# Patient Record
Sex: FEMALE | Race: WHITE | NOT HISPANIC OR LATINO | URBAN - METROPOLITAN AREA
[De-identification: names, ages, dates, MRNs, and addresses within clinical notes are randomized per-mention and may not be internally consistent; named-entity substitution may affect disease eponyms.]

---

## 2017-11-29 ENCOUNTER — OFFICE (OUTPATIENT)
Dept: URBAN - METROPOLITAN AREA CLINIC 11 | Facility: CLINIC | Age: 62
End: 2017-11-29

## 2017-11-29 VITALS
HEART RATE: 70 BPM | SYSTOLIC BLOOD PRESSURE: 166 MMHG | DIASTOLIC BLOOD PRESSURE: 83 MMHG | HEIGHT: 65 IN | WEIGHT: 197 LBS

## 2017-11-29 DIAGNOSIS — K21.9 GASTRO-ESOPHAGEAL REFLUX DISEASE WITHOUT ESOPHAGITIS: ICD-10-CM

## 2017-11-29 DIAGNOSIS — Z86.010 PERSONAL HISTORY OF COLONIC POLYPS: ICD-10-CM

## 2017-11-29 DIAGNOSIS — K75.81 NONALCOHOLIC STEATOHEPATITIS (NASH): ICD-10-CM

## 2017-11-29 DIAGNOSIS — R94.5 ABNORMAL RESULTS OF LIVER FUNCTION STUDIES: ICD-10-CM

## 2017-11-29 DIAGNOSIS — E66.9 OBESITY, UNSPECIFIED: ICD-10-CM

## 2017-11-29 DIAGNOSIS — K74.3 PRIMARY BILIARY CIRRHOSIS: ICD-10-CM

## 2017-11-29 LAB
CBC, PLATELET, NO DIFFERENTIAL: HEMATOCRIT: 39.5 % (ref 34–46.6)
CBC, PLATELET, NO DIFFERENTIAL: HEMOGLOBIN: 13 G/DL (ref 11.1–15.9)
CBC, PLATELET, NO DIFFERENTIAL: MCH: 29.1 PG (ref 26.6–33)
CBC, PLATELET, NO DIFFERENTIAL: MCHC: 32.9 G/DL (ref 31.5–35.7)
CBC, PLATELET, NO DIFFERENTIAL: MCV: 89 FL (ref 79–97)
CBC, PLATELET, NO DIFFERENTIAL: PLATELETS: 306 X10E3/UL (ref 150–379)
CBC, PLATELET, NO DIFFERENTIAL: RBC: 4.46 X10E6/UL (ref 3.77–5.28)
CBC, PLATELET, NO DIFFERENTIAL: RDW: 14.2 % (ref 12.3–15.4)
CBC, PLATELET, NO DIFFERENTIAL: WBC: 8.5 X10E3/UL (ref 3.4–10.8)
COMP. METABOLIC PANEL (14): A/G RATIO: 1.6 (ref 1.2–2.2)
COMP. METABOLIC PANEL (14): ALBUMIN, SERUM: 4.6 G/DL (ref 3.6–4.8)
COMP. METABOLIC PANEL (14): ALKALINE PHOSPHATASE, S: 206 IU/L — HIGH (ref 39–117)
COMP. METABOLIC PANEL (14): ALT (SGPT): 32 IU/L (ref 0–32)
COMP. METABOLIC PANEL (14): AST (SGOT): 38 IU/L (ref 0–40)
COMP. METABOLIC PANEL (14): BILIRUBIN, TOTAL: 0.4 MG/DL (ref 0–1.2)
COMP. METABOLIC PANEL (14): BUN/CREATININE RATIO: 16 (ref 12–28)
COMP. METABOLIC PANEL (14): BUN: 15 MG/DL (ref 8–27)
COMP. METABOLIC PANEL (14): CALCIUM, SERUM: 9.7 MG/DL (ref 8.7–10.3)
COMP. METABOLIC PANEL (14): CARBON DIOXIDE, TOTAL: 27 MMOL/L (ref 18–29)
COMP. METABOLIC PANEL (14): CHLORIDE, SERUM: 96 MMOL/L (ref 96–106)
COMP. METABOLIC PANEL (14): CREATININE, SERUM: 0.96 MG/DL (ref 0.57–1)
COMP. METABOLIC PANEL (14): EGFR IF AFRICN AM: 73 ML/MIN/1.73 (ref 59–?)
COMP. METABOLIC PANEL (14): EGFR IF NONAFRICN AM: 64 ML/MIN/1.73 (ref 59–?)
COMP. METABOLIC PANEL (14): GLOBULIN, TOTAL: 2.8 G/DL (ref 1.5–4.5)
COMP. METABOLIC PANEL (14): GLUCOSE, SERUM: 104 MG/DL — HIGH (ref 65–99)
COMP. METABOLIC PANEL (14): POTASSIUM, SERUM: 4.9 MMOL/L (ref 3.5–5.2)
COMP. METABOLIC PANEL (14): PROTEIN, TOTAL, SERUM: 7.4 G/DL (ref 6–8.5)
COMP. METABOLIC PANEL (14): SODIUM, SERUM: 141 MMOL/L (ref 134–144)
PROTHROMBIN TIME (PT): INR: 0.9 (ref 0.8–1.2)
PROTHROMBIN TIME (PT): PROTHROMBIN TIME: 9.7 SEC (ref 9.1–12)
VITAMIN B12 AND FOLATE: FOLATE (FOLIC ACID), SERUM: >20 NG/ML
VITAMIN B12 AND FOLATE: VITAMIN B12: 371 PG/ML (ref 211–946)
VITAMIN D, 25-HYDROXY: 57 NG/ML (ref 30–100)

## 2017-11-29 PROCEDURE — 99213 OFFICE O/P EST LOW 20 MIN: CPT | Performed by: INTERNAL MEDICINE

## 2017-11-29 NOTE — SERVICEHPINOTES
Ms. Mendez is a 62-year-old female here for follow-up of primary biliary cholangitis. She was initially seen by me in May 2015 because of elevated liver enzymes. She had been followed by Dr. Madrid in the past and apparently had a liver biopsy in 2005 which she told was negative. She returned with persistent elevated liver enzymes including significant elevation of alkaline phosphatase with normal bilirubin. Abdominal ultrasound was performed that revealed fatty liver but otherwise did not reveal any biliary obstruction. CT-guided liver biopsy was performed in 2015 which revealed steatohepatitis grade 1 of 4 and primary biliary cholangitis with fibrosis stage 1 of 4. Because of this the patient was started on ursodiol 600 mg twice a day, which is consistent with 13-15 mg/kg per day. Due to no improvement of her alkaline phosphatase on Ursodiol we did switch her to Ocaliva which she started at the end of last year. Her alk-phos did come down slightly, but unfortunate the patient has not followed up for repeat blood work. Overall the patient states that she is doing well. She denies any fevers, chills, nausea, vomiting, abdominal pain, diarrhea, constipation, or rectal bleeding. She does have some occasional reflux for which she takes Prevacid over-the-counter on an as-needed basis. She does have some occasional skin itching but this usually occurs only in the winter and usually right after she gets out of the shower. She does not have any itching on a regular basis and is usually controlled with lotion. She did undergo colon polyp surveillance in November 2015 with removal of three small adenomas. BR

## 2017-11-29 NOTE — SERVICENOTES
Overall the patient is doing well.  She is tolerating Ocaliva.  Unfortunately, as the patient has not come back for follow-up labs it is hard to know as to whether not it is working so we will recheck labs today.  Also check some vitamin levels.  She should otherwise let us know if she has any problems with the medications or any signs of hepatic decompensation.

## 2018-03-12 ENCOUNTER — OFFICE (OUTPATIENT)
Dept: URBAN - METROPOLITAN AREA CLINIC 14 | Facility: CLINIC | Age: 63
End: 2018-03-12

## 2018-06-20 ENCOUNTER — OFFICE (OUTPATIENT)
Dept: URBAN - METROPOLITAN AREA CLINIC 14 | Facility: CLINIC | Age: 63
End: 2018-06-20

## 2018-11-30 ENCOUNTER — OFFICE (OUTPATIENT)
Dept: URBAN - METROPOLITAN AREA CLINIC 11 | Facility: CLINIC | Age: 63
End: 2018-11-30

## 2018-11-30 VITALS
DIASTOLIC BLOOD PRESSURE: 75 MMHG | HEART RATE: 65 BPM | SYSTOLIC BLOOD PRESSURE: 134 MMHG | HEIGHT: 65 IN | WEIGHT: 180 LBS

## 2018-11-30 DIAGNOSIS — K75.81 NONALCOHOLIC STEATOHEPATITIS (NASH): ICD-10-CM

## 2018-11-30 DIAGNOSIS — D12.6 BENIGN NEOPLASM OF COLON, UNSPECIFIED: ICD-10-CM

## 2018-11-30 DIAGNOSIS — K74.3 PRIMARY BILIARY CIRRHOSIS: ICD-10-CM

## 2018-11-30 DIAGNOSIS — Z86.010 PERSONAL HISTORY OF COLONIC POLYPS: ICD-10-CM

## 2018-11-30 DIAGNOSIS — R74.8 ABNORMAL LEVELS OF OTHER SERUM ENZYMES: ICD-10-CM

## 2018-11-30 DIAGNOSIS — K21.9 GASTRO-ESOPHAGEAL REFLUX DISEASE WITHOUT ESOPHAGITIS: ICD-10-CM

## 2018-11-30 LAB
CBC, PLATELET, NO DIFFERENTIAL: HEMATOCRIT: 37.4 % (ref 34–46.6)
CBC, PLATELET, NO DIFFERENTIAL: HEMOGLOBIN: 11.9 G/DL (ref 11.1–15.9)
CBC, PLATELET, NO DIFFERENTIAL: MCH: 28.7 PG (ref 26.6–33)
CBC, PLATELET, NO DIFFERENTIAL: MCHC: 31.8 G/DL (ref 31.5–35.7)
CBC, PLATELET, NO DIFFERENTIAL: MCV: 90 FL (ref 79–97)
CBC, PLATELET, NO DIFFERENTIAL: PLATELETS: 331 X10E3/UL (ref 150–379)
CBC, PLATELET, NO DIFFERENTIAL: RBC: 4.14 X10E6/UL (ref 3.77–5.28)
CBC, PLATELET, NO DIFFERENTIAL: RDW: 13.6 % (ref 12.3–15.4)
CBC, PLATELET, NO DIFFERENTIAL: WBC: 6.3 X10E3/UL (ref 3.4–10.8)
COMP. METABOLIC PANEL (14): A/G RATIO: 1.7 (ref 1.2–2.2)
COMP. METABOLIC PANEL (14): ALBUMIN: 4.3 G/DL (ref 3.6–4.8)
COMP. METABOLIC PANEL (14): ALKALINE PHOSPHATASE: 312 IU/L — HIGH (ref 39–117)
COMP. METABOLIC PANEL (14): ALT (SGPT): 32 IU/L (ref 0–32)
COMP. METABOLIC PANEL (14): AST (SGOT): 35 IU/L (ref 0–40)
COMP. METABOLIC PANEL (14): BILIRUBIN, TOTAL: 0.4 MG/DL (ref 0–1.2)
COMP. METABOLIC PANEL (14): BUN/CREATININE RATIO: 14 (ref 12–28)
COMP. METABOLIC PANEL (14): BUN: 13 MG/DL (ref 8–27)
COMP. METABOLIC PANEL (14): CALCIUM: 10.3 MG/DL (ref 8.7–10.3)
COMP. METABOLIC PANEL (14): CARBON DIOXIDE, TOTAL: 26 MMOL/L (ref 20–29)
COMP. METABOLIC PANEL (14): CHLORIDE: 103 MMOL/L (ref 96–106)
COMP. METABOLIC PANEL (14): CREATININE: 0.94 MG/DL (ref 0.57–1)
COMP. METABOLIC PANEL (14): EGFR IF AFRICN AM: 75 ML/MIN/1.73 (ref 59–?)
COMP. METABOLIC PANEL (14): EGFR IF NONAFRICN AM: 65 ML/MIN/1.73 (ref 59–?)
COMP. METABOLIC PANEL (14): GLOBULIN, TOTAL: 2.5 G/DL (ref 1.5–4.5)
COMP. METABOLIC PANEL (14): GLUCOSE: 102 MG/DL — HIGH (ref 65–99)
COMP. METABOLIC PANEL (14): POTASSIUM: 6.1 MMOL/L — HIGH (ref 3.5–5.2)
COMP. METABOLIC PANEL (14): PROTEIN, TOTAL: 6.8 G/DL (ref 6–8.5)
COMP. METABOLIC PANEL (14): SODIUM: 145 MMOL/L — HIGH (ref 134–144)
GGT: 569 IU/L — HIGH (ref 0–60)
IMMUNOGLOBULIN M, QN, SERUM: 45 MG/DL (ref 26–217)
PROTHROMBIN TIME (PT): INR: 0.9 (ref 0.8–1.2)
PROTHROMBIN TIME (PT): PROTHROMBIN TIME: 9.7 SEC (ref 9.1–12)

## 2018-11-30 PROCEDURE — 99213 OFFICE O/P EST LOW 20 MIN: CPT | Performed by: INTERNAL MEDICINE

## 2018-11-30 RX ORDER — URSODIOL 300 MG/1
CAPSULE ORAL
Qty: 120 | Refills: 11 | Status: COMPLETED
End: 2020-02-12

## 2018-11-30 RX ORDER — SODIUM PICOSULFATE, MAGNESIUM OXIDE, AND ANHYDROUS CITRIC ACID 10; 3.5; 12 MG/160ML; G/160ML; G/160ML
LIQUID ORAL
Qty: 1 | Refills: 0 | Status: COMPLETED
Start: 2018-11-30 | End: 2018-12-28

## 2018-11-30 NOTE — SERVICEHPINOTES
Ms. Mendez is a 63-year-old female here for follow-up of primary biliary cholangitis. She was initially seen by me in May 2015 because of elevated liver enzymes. She had been followed by Dr. Madrid in the past and apparently had a liver biopsy in 2005 which she told was negative. She returned with persistent elevated liver enzymes including significant elevation of alkaline phosphatase with normal bilirubin. Abdominal ultrasound was performed that revealed fatty liver but otherwise did not reveal any biliary obstruction. CT-guided liver biopsy was performed in 2015 which revealed steatohepatitis grade 1 of 4 and primary biliary cholangitis with fibrosis stage 1 of 4. Because of this the patient was started on ursodiol 600 mg twice a day, which is consistent with 13-15 mg/kg per day. Due to no improvement of her alkaline phosphatase on Ursodiol we did switch her to Ocaliva which she started at the end of 2016. Her alk-phos did come down slightly, but unfortunately the patient has not followed up for repeat blood work. She does have some occasional reflux for which she takes Prevacid over-the-counter on an as-needed basis. She did undergo colon polyp surveillance in November 2015 with removal of three small adenomas.Overall the patient is doing well. She denies any fevers, chills, nausea, vomiting, abdominal pain, diarrhea, constipation, or rectal bleeding. She does not have any jaundice or skin itching. Unfortunately, the patient did not really keep up with lab work. Her most recent blood work in June did reveal slight increase in LFTs which could be related to SHIRLEY but also the PBC. Her prescription for the Ocaliva ran out and so we tried to get this renewed. We were able to get a renewed, however the patient misunderstood and decided to go ahead and stop Ocalvia and is now back to only taking Ursodiol 600 mg twice a day. BR

## 2018-11-30 NOTE — SERVICENOTES
Overall she appears to be doing well.  She does not have any symptoms.  Her liver enzymes have mild persistent elevation.  Given that she did not have significant response to ursodiol or Ocaliva, we will try to get her in for a research study for PBC which just opened up.  If she is not a candidate for this then I recommend we go back on Ocaliva.  She has done well with weight loss and continues to watch her diet.  The patient is now due for surveillance colonoscopy so we will schedule her for this as well.

## 2018-12-05 ENCOUNTER — OFFICE (OUTPATIENT)
Dept: URBAN - METROPOLITAN AREA CLINIC 14 | Facility: CLINIC | Age: 63
End: 2018-12-05

## 2018-12-28 ENCOUNTER — OFFICE (OUTPATIENT)
Dept: URBAN - METROPOLITAN AREA PATHOLOGY 22 | Facility: PATHOLOGY | Age: 63
End: 2018-12-28

## 2018-12-28 ENCOUNTER — AMBULATORY SURGICAL CENTER (OUTPATIENT)
Dept: URBAN - METROPOLITAN AREA SURGERY 2 | Facility: SURGERY | Age: 63
End: 2018-12-28

## 2018-12-28 VITALS
HEART RATE: 78 BPM | OXYGEN SATURATION: 100 % | OXYGEN SATURATION: 97 % | WEIGHT: 175 LBS | OXYGEN SATURATION: 100 % | RESPIRATION RATE: 18 BRPM | SYSTOLIC BLOOD PRESSURE: 108 MMHG | HEART RATE: 64 BPM | RESPIRATION RATE: 20 BRPM | SYSTOLIC BLOOD PRESSURE: 133 MMHG | OXYGEN SATURATION: 97 % | TEMPERATURE: 97.7 F | SYSTOLIC BLOOD PRESSURE: 126 MMHG | DIASTOLIC BLOOD PRESSURE: 56 MMHG | TEMPERATURE: 97.7 F | DIASTOLIC BLOOD PRESSURE: 70 MMHG | SYSTOLIC BLOOD PRESSURE: 116 MMHG | RESPIRATION RATE: 18 BRPM | TEMPERATURE: 97.9 F | HEART RATE: 64 BPM | SYSTOLIC BLOOD PRESSURE: 108 MMHG | WEIGHT: 175 LBS | HEIGHT: 65 IN | HEART RATE: 72 BPM | SYSTOLIC BLOOD PRESSURE: 133 MMHG | DIASTOLIC BLOOD PRESSURE: 74 MMHG | DIASTOLIC BLOOD PRESSURE: 56 MMHG | HEART RATE: 72 BPM | DIASTOLIC BLOOD PRESSURE: 74 MMHG | SYSTOLIC BLOOD PRESSURE: 116 MMHG | TEMPERATURE: 97.9 F | RESPIRATION RATE: 16 BRPM | RESPIRATION RATE: 16 BRPM | SYSTOLIC BLOOD PRESSURE: 126 MMHG | HEIGHT: 65 IN | DIASTOLIC BLOOD PRESSURE: 70 MMHG | HEART RATE: 78 BPM | RESPIRATION RATE: 20 BRPM

## 2018-12-28 DIAGNOSIS — Z86.010 PERSONAL HISTORY OF COLONIC POLYPS: ICD-10-CM

## 2018-12-28 DIAGNOSIS — K64.0 FIRST DEGREE HEMORRHOIDS: ICD-10-CM

## 2018-12-28 DIAGNOSIS — D12.2 BENIGN NEOPLASM OF ASCENDING COLON: ICD-10-CM

## 2018-12-28 DIAGNOSIS — D12.0 BENIGN NEOPLASM OF CECUM: ICD-10-CM

## 2018-12-28 DIAGNOSIS — D12.6 BENIGN NEOPLASM OF COLON, UNSPECIFIED: ICD-10-CM

## 2018-12-28 PROCEDURE — 88305 TISSUE EXAM BY PATHOLOGIST: CPT | Performed by: INTERNAL MEDICINE

## 2018-12-28 PROCEDURE — 45380 COLONOSCOPY AND BIOPSY: CPT | Mod: 59 | Performed by: INTERNAL MEDICINE

## 2018-12-28 PROCEDURE — 45385 COLONOSCOPY W/LESION REMOVAL: CPT | Mod: 33 | Performed by: INTERNAL MEDICINE

## 2019-01-10 ENCOUNTER — OFFICE (OUTPATIENT)
Dept: URBAN - METROPOLITAN AREA CLINIC 19 | Facility: CLINIC | Age: 64
End: 2019-01-10

## 2019-01-10 DIAGNOSIS — K75.81 NONALCOHOLIC STEATOHEPATITIS (NASH): ICD-10-CM

## 2019-01-10 PROCEDURE — 76705 ECHO EXAM OF ABDOMEN: CPT | Performed by: INTERNAL MEDICINE

## 2019-05-28 ENCOUNTER — OFFICE (OUTPATIENT)
Dept: URBAN - METROPOLITAN AREA CLINIC 11 | Facility: CLINIC | Age: 64
End: 2019-05-28

## 2019-08-19 ENCOUNTER — OFFICE (OUTPATIENT)
Dept: URBAN - METROPOLITAN AREA CLINIC 14 | Facility: CLINIC | Age: 64
End: 2019-08-19

## 2019-12-19 ENCOUNTER — OFFICE (OUTPATIENT)
Dept: URBAN - METROPOLITAN AREA CLINIC 14 | Facility: CLINIC | Age: 64
End: 2019-12-19

## 2019-12-19 VITALS
SYSTOLIC BLOOD PRESSURE: 131 MMHG | HEART RATE: 76 BPM | DIASTOLIC BLOOD PRESSURE: 76 MMHG | WEIGHT: 175 LBS | HEIGHT: 65 IN

## 2019-12-19 DIAGNOSIS — K21.9 GASTRO-ESOPHAGEAL REFLUX DISEASE WITHOUT ESOPHAGITIS: ICD-10-CM

## 2019-12-19 DIAGNOSIS — K75.81 NONALCOHOLIC STEATOHEPATITIS (NASH): ICD-10-CM

## 2019-12-19 DIAGNOSIS — K74.3 PRIMARY BILIARY CIRRHOSIS: ICD-10-CM

## 2019-12-19 DIAGNOSIS — Z86.010 PERSONAL HISTORY OF COLONIC POLYPS: ICD-10-CM

## 2019-12-19 LAB
CBC, PLATELET, NO DIFFERENTIAL: HEMATOCRIT: 36.6 % (ref 34–46.6)
CBC, PLATELET, NO DIFFERENTIAL: HEMOGLOBIN: 11.9 G/DL (ref 11.1–15.9)
CBC, PLATELET, NO DIFFERENTIAL: MCH: 29.2 PG (ref 26.6–33)
CBC, PLATELET, NO DIFFERENTIAL: MCHC: 32.5 G/DL (ref 31.5–35.7)
CBC, PLATELET, NO DIFFERENTIAL: MCV: 90 FL (ref 79–97)
CBC, PLATELET, NO DIFFERENTIAL: NRBC: (no result)
CBC, PLATELET, NO DIFFERENTIAL: PLATELETS: 325 X10E3/UL (ref 150–450)
CBC, PLATELET, NO DIFFERENTIAL: RBC: 4.08 X10E6/UL (ref 3.77–5.28)
CBC, PLATELET, NO DIFFERENTIAL: RDW: 14.2 % (ref 12.3–15.4)
CBC, PLATELET, NO DIFFERENTIAL: WBC: 9 X10E3/UL (ref 3.4–10.8)
COMP. METABOLIC PANEL (14): A/G RATIO: 1.7 (ref 1.2–2.2)
COMP. METABOLIC PANEL (14): ALBUMIN: 4.6 G/DL (ref 3.6–4.8)
COMP. METABOLIC PANEL (14): ALKALINE PHOSPHATASE: 321 IU/L — HIGH (ref 39–117)
COMP. METABOLIC PANEL (14): ALT (SGPT): 40 IU/L — HIGH (ref 0–32)
COMP. METABOLIC PANEL (14): AST (SGOT): 52 IU/L — HIGH (ref 0–40)
COMP. METABOLIC PANEL (14): BILIRUBIN, TOTAL: 0.4 MG/DL (ref 0–1.2)
COMP. METABOLIC PANEL (14): BUN/CREATININE RATIO: 22 (ref 12–28)
COMP. METABOLIC PANEL (14): BUN: 17 MG/DL (ref 8–27)
COMP. METABOLIC PANEL (14): CALCIUM: 10.5 MG/DL — HIGH (ref 8.7–10.3)
COMP. METABOLIC PANEL (14): CARBON DIOXIDE, TOTAL: 24 MMOL/L (ref 20–29)
COMP. METABOLIC PANEL (14): CHLORIDE: 100 MMOL/L (ref 96–106)
COMP. METABOLIC PANEL (14): CREATININE: 0.79 MG/DL (ref 0.57–1)
COMP. METABOLIC PANEL (14): EGFR IF AFRICN AM: 91 ML/MIN/1.73 (ref 59–?)
COMP. METABOLIC PANEL (14): EGFR IF NONAFRICN AM: 79 ML/MIN/1.73 (ref 59–?)
COMP. METABOLIC PANEL (14): GLOBULIN, TOTAL: 2.7 G/DL (ref 1.5–4.5)
COMP. METABOLIC PANEL (14): GLUCOSE: 88 MG/DL (ref 65–99)
COMP. METABOLIC PANEL (14): POTASSIUM: 5.4 MMOL/L — HIGH (ref 3.5–5.2)
COMP. METABOLIC PANEL (14): PROTEIN, TOTAL: 7.3 G/DL (ref 6–8.5)
COMP. METABOLIC PANEL (14): SODIUM: 141 MMOL/L (ref 134–144)
GGT: 610 IU/L — HIGH (ref 0–60)
PROTHROMBIN TIME (PT): INR: 0.9 (ref 0.8–1.2)
PROTHROMBIN TIME (PT): PROTHROMBIN TIME: 9.8 SEC (ref 9.1–12)

## 2019-12-19 PROCEDURE — 99213 OFFICE O/P EST LOW 20 MIN: CPT | Performed by: INTERNAL MEDICINE

## 2019-12-19 NOTE — SERVICEHPINOTES
Ms. Mendez is a 64-year-old female here for follow-up of primary biliary cholangitis and SHIRLEY. She was initially seen by me in May 2015 because of elevated liver enzymes. She had been followed by Dr. Madrid in the past and apparently had a liver biopsy in 2005 which she told was negative. She returned with persistent elevated liver enzymes including significant elevation of alkaline phosphatase with normal bilirubin. Abdominal ultrasound was performed that revealed fatty liver but otherwise did not reveal any biliary obstruction. CT-guided liver biopsy was performed in 2015 which revealed steatohepatitis grade 1 of 4 and primary biliary cholangitis with fibrosis stage 1 of 4. Because of this the patient was started on ursodiol 600 mg twice a day, which is consistent with 13-15 mg/kg per day. Due to no improvement of her alkaline phosphatase on Ursodiol we did switch her to Ocaliva which she started at the end of 2016. Her alk-phos did come down slightly. She does have some occasional reflux for which she takes Prevacid over-the-counter on an as-needed basis. She did undergo colon polyp surveillance in November 2015 with removal of three small adenomas.Interim History:BRThe patient ultimately running out of the Ocaliva and never restarted. When we last saw her we decided to go ahead and have her evaluated for research study but unfortunately she failed screening because of the presence of SHIRLEY. She did undergo repeat colonoscopy in December 2018 with removal of a couple of adenomatous polyps. Liver ultrasound was unremarkable except for some fatty liver. Her most recent blood work in August did reveal persistent elevation of alk-phos at 387 and GGT at 665. Because of this and her failure of qualification for the research study we decided to put her back Ocaliva for which she started about one month ago. She has tolerated it well with no abdominal pain, nausea, vomiting, or diarrhea. She otherwise denies any fevers, chills, nausea, vomiting, or abdominal pain. TA

## 2019-12-19 NOTE — SERVICENOTES
Given that she has had some response to Ocaliva in the past we will go ahead and have her continue this.  We will check blood work today.  She has been taking Ursodiol along with this medication so we will have her stop the Ursodiol and recheck labs in three months.  In the meantime I will also recheck fibroscan to ensure that she has not had any progression of fibrosis.

## 2020-03-04 ENCOUNTER — OFFICE (OUTPATIENT)
Dept: URBAN - METROPOLITAN AREA CLINIC 14 | Facility: CLINIC | Age: 65
End: 2020-03-04

## 2020-03-31 ENCOUNTER — OFFICE (OUTPATIENT)
Dept: URBAN - METROPOLITAN AREA CLINIC 14 | Facility: CLINIC | Age: 65
End: 2020-03-31

## 2020-03-31 VITALS — HEIGHT: 65 IN

## 2020-03-31 DIAGNOSIS — K75.81 NONALCOHOLIC STEATOHEPATITIS (NASH): ICD-10-CM

## 2020-03-31 PROCEDURE — 91200 LIVER ELASTOGRAPHY: CPT | Performed by: INTERNAL MEDICINE

## 2020-06-25 ENCOUNTER — OFFICE (OUTPATIENT)
Dept: URBAN - METROPOLITAN AREA CLINIC 14 | Facility: CLINIC | Age: 65
End: 2020-06-25

## 2020-06-25 VITALS
HEIGHT: 65 IN | HEART RATE: 83 BPM | RESPIRATION RATE: 16 BRPM | DIASTOLIC BLOOD PRESSURE: 73 MMHG | WEIGHT: 162 LBS | SYSTOLIC BLOOD PRESSURE: 116 MMHG

## 2020-06-25 DIAGNOSIS — K74.3 PRIMARY BILIARY CIRRHOSIS: ICD-10-CM

## 2020-06-25 DIAGNOSIS — R74.8 ABNORMAL LEVELS OF OTHER SERUM ENZYMES: ICD-10-CM

## 2020-06-25 DIAGNOSIS — K75.81 NONALCOHOLIC STEATOHEPATITIS (NASH): ICD-10-CM

## 2020-06-25 LAB
CBC, PLATELET, NO DIFFERENTIAL: HEMATOCRIT: 33.9 % — LOW (ref 34–46.6)
CBC, PLATELET, NO DIFFERENTIAL: HEMOGLOBIN: 11.8 G/DL (ref 11.1–15.9)
CBC, PLATELET, NO DIFFERENTIAL: MCH: 30.8 PG (ref 26.6–33)
CBC, PLATELET, NO DIFFERENTIAL: MCHC: 34.8 G/DL (ref 31.5–35.7)
CBC, PLATELET, NO DIFFERENTIAL: MCV: 89 FL (ref 79–97)
CBC, PLATELET, NO DIFFERENTIAL: PLATELETS: 305 X10E3/UL (ref 150–450)
CBC, PLATELET, NO DIFFERENTIAL: RBC: 3.83 X10E6/UL (ref 3.77–5.28)
CBC, PLATELET, NO DIFFERENTIAL: RDW: 13.4 % (ref 11.7–15.4)
CBC, PLATELET, NO DIFFERENTIAL: WBC: 9 X10E3/UL (ref 3.4–10.8)
COMP. METABOLIC PANEL (14): A/G RATIO: 1.8 (ref 1.2–2.2)
COMP. METABOLIC PANEL (14): ALBUMIN: 4.6 G/DL (ref 3.8–4.8)
COMP. METABOLIC PANEL (14): ALKALINE PHOSPHATASE: 293 IU/L — HIGH (ref 39–117)
COMP. METABOLIC PANEL (14): ALT (SGPT): 27 IU/L (ref 0–32)
COMP. METABOLIC PANEL (14): AST (SGOT): 39 IU/L (ref 0–40)
COMP. METABOLIC PANEL (14): BILIRUBIN, TOTAL: 0.7 MG/DL (ref 0–1.2)
COMP. METABOLIC PANEL (14): BUN/CREATININE RATIO: 18 (ref 12–28)
COMP. METABOLIC PANEL (14): BUN: 17 MG/DL (ref 8–27)
COMP. METABOLIC PANEL (14): CALCIUM: 9.9 MG/DL (ref 8.7–10.3)
COMP. METABOLIC PANEL (14): CARBON DIOXIDE, TOTAL: 24 MMOL/L (ref 20–29)
COMP. METABOLIC PANEL (14): CHLORIDE: 96 MMOL/L (ref 96–106)
COMP. METABOLIC PANEL (14): CREATININE: 0.93 MG/DL (ref 0.57–1)
COMP. METABOLIC PANEL (14): EGFR IF AFRICN AM: 75 ML/MIN/1.73 (ref 59–?)
COMP. METABOLIC PANEL (14): EGFR IF NONAFRICN AM: 65 ML/MIN/1.73 (ref 59–?)
COMP. METABOLIC PANEL (14): GLOBULIN, TOTAL: 2.6 G/DL (ref 1.5–4.5)
COMP. METABOLIC PANEL (14): GLUCOSE: 81 MG/DL (ref 65–99)
COMP. METABOLIC PANEL (14): POTASSIUM: 4.6 MMOL/L (ref 3.5–5.2)
COMP. METABOLIC PANEL (14): PROTEIN, TOTAL: 7.2 G/DL (ref 6–8.5)
COMP. METABOLIC PANEL (14): SODIUM: 137 MMOL/L (ref 134–144)
GGT: 607 IU/L — HIGH (ref 0–60)

## 2020-06-25 PROCEDURE — 99214 OFFICE O/P EST MOD 30 MIN: CPT

## 2020-06-25 NOTE — SERVICEHPINOTES
Ms. Mendez is a 64-year-old female here for a follow-up for PCB/SHIRLEY. The patient recently underwent a fibroscan 3/31/2020 the revealed none to minimal fibrosis (FO-F1). The patient is currently on Ocaliva and Ursodial. The patient reports taking her medication as directed has no problem obtaining her medication. The patient reports that she has lost approximately 50 lb over the last year. The patient states that she is trying to lose weight to control her cholesterol and diabetes. The patient states that she exercises on a regular basis. The patient has lost 13 lb since her last visit intentionally. The patient denies any abdominal pain, nausea, vomiting, jaundice, changes in urine or stool color, or anorexia. The patient is due for colonoscopy in 2023.

## 2020-06-25 NOTE — SERVICENOTES
The patient was seen along with Lauryn Escoto NP.  I have evaluated the patient, discussed with NP, and agree with the above history, impression, and plan.Overall she does appear to be doing well.  Will check her basic labs as above.  Her abdominal exam is benign.  She has no tenderness.  Liver is not enlarged.  She should notify us if she has any worsening of symptoms, itching, jaundice, etc in the meantime.

## 2020-12-10 ENCOUNTER — OFFICE (OUTPATIENT)
Dept: URBAN - METROPOLITAN AREA CLINIC 14 | Facility: CLINIC | Age: 65
End: 2020-12-10

## 2020-12-10 VITALS
DIASTOLIC BLOOD PRESSURE: 77 MMHG | WEIGHT: 162 LBS | SYSTOLIC BLOOD PRESSURE: 146 MMHG | OXYGEN SATURATION: 98 % | RESPIRATION RATE: 16 BRPM | HEIGHT: 65 IN | HEART RATE: 75 BPM

## 2020-12-10 DIAGNOSIS — K59.00 CONSTIPATION, UNSPECIFIED: ICD-10-CM

## 2020-12-10 DIAGNOSIS — K74.3 PRIMARY BILIARY CIRRHOSIS: ICD-10-CM

## 2020-12-10 DIAGNOSIS — K75.81 NONALCOHOLIC STEATOHEPATITIS (NASH): ICD-10-CM

## 2020-12-10 PROCEDURE — 99214 OFFICE O/P EST MOD 30 MIN: CPT

## 2020-12-10 RX ORDER — OBETICHOLIC ACID 10 MG/1
10 TABLET, FILM COATED ORAL
Qty: 30 | Refills: 11 | Status: COMPLETED
Start: 2019-08-30 | End: 2021-06-10

## 2020-12-10 RX ORDER — URSODIOL 300 MG/1
1200 CAPSULE ORAL
Qty: 120 | Refills: 11 | Status: ACTIVE
Start: 2020-03-05

## 2020-12-10 NOTE — SERVICEHPINOTES
Ms. Mendez is a 65-year-old female here for a follow-up for PCB/SHIRLEY. The patient had labs performed 06/25/2020 which did reveal an elevated alkaline phos of 293 Fadia GG T at 6:07 a.m. or tore an improvement from her previous visit. The patient had a fibroscan performed 03/31/2020 which revealed 0 2 minimal fibrosis. The patient is currently on Ocaliva and Ursodiol and states that she has no problem obtaining or taking her medication. The patient reports that she has been maintaining her intentional weight loss, although she has not been exercising as much since Covid-19. The patient states that she has had some difficulty recently due to constipation. The patient reports that due to her intentional weight loss she was able to come off metformin. The patient is taking fiber as needed. The patient reports that she does have an adequate intake of water. The patient has not tried a MiraLax regimen but is taking probiotics. The patient denies any abdominal pain, jaundice, pruritus, melena, hematochezia or unintentional weight loss. The patient is due for colonoscopy recall in 2023.

## 2020-12-10 NOTE — SERVICENOTES
The patient was seen along with Lauryn Escoto NP.  I have evaluated the patient, discussed with NP, and agree with the above documented findings, impression, and plan of care.-BRENNON

## 2020-12-11 LAB
CBC, PLATELET, NO DIFFERENTIAL: HEMATOCRIT: 34.1 % (ref 34–46.6)
CBC, PLATELET, NO DIFFERENTIAL: HEMOGLOBIN: 11.2 G/DL (ref 11.1–15.9)
CBC, PLATELET, NO DIFFERENTIAL: MCH: 29.8 PG (ref 26.6–33)
CBC, PLATELET, NO DIFFERENTIAL: MCHC: 32.8 G/DL (ref 31.5–35.7)
CBC, PLATELET, NO DIFFERENTIAL: MCV: 91 FL (ref 79–97)
CBC, PLATELET, NO DIFFERENTIAL: NRBC: (no result)
CBC, PLATELET, NO DIFFERENTIAL: PLATELETS: 329 X10E3/UL (ref 150–450)
CBC, PLATELET, NO DIFFERENTIAL: RBC: 3.76 X10E6/UL — LOW (ref 3.77–5.28)
CBC, PLATELET, NO DIFFERENTIAL: RDW: 12.5 % (ref 11.7–15.4)
CBC, PLATELET, NO DIFFERENTIAL: WBC: 8.1 X10E3/UL (ref 3.4–10.8)
COMP. METABOLIC PANEL (14): A/G RATIO: 1.7 (ref 1.2–2.2)
COMP. METABOLIC PANEL (14): ALBUMIN: 4.4 G/DL (ref 3.8–4.8)
COMP. METABOLIC PANEL (14): ALKALINE PHOSPHATASE: 260 IU/L — HIGH (ref 39–117)
COMP. METABOLIC PANEL (14): ALT (SGPT): 18 IU/L (ref 0–32)
COMP. METABOLIC PANEL (14): AST (SGOT): 32 IU/L (ref 0–40)
COMP. METABOLIC PANEL (14): BILIRUBIN, TOTAL: 0.4 MG/DL (ref 0–1.2)
COMP. METABOLIC PANEL (14): BUN/CREATININE RATIO: 20 (ref 12–28)
COMP. METABOLIC PANEL (14): BUN: 16 MG/DL (ref 8–27)
COMP. METABOLIC PANEL (14): CALCIUM: 9.8 MG/DL (ref 8.7–10.3)
COMP. METABOLIC PANEL (14): CARBON DIOXIDE, TOTAL: 24 MMOL/L (ref 20–29)
COMP. METABOLIC PANEL (14): CHLORIDE: 99 MMOL/L (ref 96–106)
COMP. METABOLIC PANEL (14): CREATININE: 0.81 MG/DL (ref 0.57–1)
COMP. METABOLIC PANEL (14): EGFR IF AFRICN AM: 88 ML/MIN/1.73 (ref 59–?)
COMP. METABOLIC PANEL (14): EGFR IF NONAFRICN AM: 76 ML/MIN/1.73 (ref 59–?)
COMP. METABOLIC PANEL (14): GLOBULIN, TOTAL: 2.6 G/DL (ref 1.5–4.5)
COMP. METABOLIC PANEL (14): GLUCOSE: 99 MG/DL (ref 65–99)
COMP. METABOLIC PANEL (14): POTASSIUM: 4.7 MMOL/L (ref 3.5–5.2)
COMP. METABOLIC PANEL (14): PROTEIN, TOTAL: 7 G/DL (ref 6–8.5)
COMP. METABOLIC PANEL (14): SODIUM: 138 MMOL/L (ref 134–144)
GGT: 311 IU/L — HIGH (ref 0–60)

## 2021-06-10 ENCOUNTER — OFFICE (OUTPATIENT)
Dept: URBAN - METROPOLITAN AREA CLINIC 14 | Facility: CLINIC | Age: 66
End: 2021-06-10

## 2021-06-10 VITALS
SYSTOLIC BLOOD PRESSURE: 134 MMHG | OXYGEN SATURATION: 100 % | HEIGHT: 65 IN | HEART RATE: 57 BPM | WEIGHT: 178 LBS | DIASTOLIC BLOOD PRESSURE: 71 MMHG

## 2021-06-10 DIAGNOSIS — K74.3 PRIMARY BILIARY CIRRHOSIS: ICD-10-CM

## 2021-06-10 DIAGNOSIS — K75.81 NONALCOHOLIC STEATOHEPATITIS (NASH): ICD-10-CM

## 2021-06-10 DIAGNOSIS — Z86.010 PERSONAL HISTORY OF COLONIC POLYPS: ICD-10-CM

## 2021-06-10 LAB
CBC, PLATELET, NO DIFFERENTIAL: HEMATOCRIT: 36.9 % (ref 34–46.6)
CBC, PLATELET, NO DIFFERENTIAL: HEMOGLOBIN: 12 G/DL (ref 11.1–15.9)
CBC, PLATELET, NO DIFFERENTIAL: MCH: 29.3 PG (ref 26.6–33)
CBC, PLATELET, NO DIFFERENTIAL: MCHC: 32.5 G/DL (ref 31.5–35.7)
CBC, PLATELET, NO DIFFERENTIAL: MCV: 90 FL (ref 79–97)
CBC, PLATELET, NO DIFFERENTIAL: NRBC: (no result)
CBC, PLATELET, NO DIFFERENTIAL: PLATELETS: 274 X10E3/UL (ref 150–450)
CBC, PLATELET, NO DIFFERENTIAL: RBC: 4.1 X10E6/UL (ref 3.77–5.28)
CBC, PLATELET, NO DIFFERENTIAL: RDW: 12.8 % (ref 11.7–15.4)
CBC, PLATELET, NO DIFFERENTIAL: WBC: 6.5 X10E3/UL (ref 3.4–10.8)
COMP. METABOLIC PANEL (14): A/G RATIO: 1.7 (ref 1.2–2.2)
COMP. METABOLIC PANEL (14): ALBUMIN: 4.5 G/DL (ref 3.8–4.8)
COMP. METABOLIC PANEL (14): ALKALINE PHOSPHATASE: 363 IU/L — HIGH (ref 48–121)
COMP. METABOLIC PANEL (14): ALT (SGPT): 29 IU/L (ref 0–32)
COMP. METABOLIC PANEL (14): AST (SGOT): 35 IU/L (ref 0–40)
COMP. METABOLIC PANEL (14): BILIRUBIN, TOTAL: 0.3 MG/DL (ref 0–1.2)
COMP. METABOLIC PANEL (14): BUN/CREATININE RATIO: 22 (ref 12–28)
COMP. METABOLIC PANEL (14): BUN: 17 MG/DL (ref 8–27)
COMP. METABOLIC PANEL (14): CALCIUM: 10 MG/DL (ref 8.7–10.3)
COMP. METABOLIC PANEL (14): CARBON DIOXIDE, TOTAL: 25 MMOL/L (ref 20–29)
COMP. METABOLIC PANEL (14): CHLORIDE: 100 MMOL/L (ref 96–106)
COMP. METABOLIC PANEL (14): CREATININE: 0.79 MG/DL (ref 0.57–1)
COMP. METABOLIC PANEL (14): EGFR IF AFRICN AM: 91 ML/MIN/1.73 (ref 59–?)
COMP. METABOLIC PANEL (14): EGFR IF NONAFRICN AM: 79 ML/MIN/1.73 (ref 59–?)
COMP. METABOLIC PANEL (14): GLOBULIN, TOTAL: 2.6 G/DL (ref 1.5–4.5)
COMP. METABOLIC PANEL (14): GLUCOSE: 91 MG/DL (ref 65–99)
COMP. METABOLIC PANEL (14): POTASSIUM: 4.9 MMOL/L (ref 3.5–5.2)
COMP. METABOLIC PANEL (14): PROTEIN, TOTAL: 7.1 G/DL (ref 6–8.5)
COMP. METABOLIC PANEL (14): SODIUM: 140 MMOL/L (ref 134–144)
PROTHROMBIN TIME (PT): INR: 1 (ref 0.9–1.2)
PROTHROMBIN TIME (PT): PROTHROMBIN TIME: 10.2 SEC (ref 9.1–12)

## 2021-06-10 PROCEDURE — 99213 OFFICE O/P EST LOW 20 MIN: CPT | Performed by: INTERNAL MEDICINE

## 2021-06-10 RX ORDER — URSODIOL 300 MG/1
1200 CAPSULE ORAL
Qty: 120 | Refills: 11 | Status: ACTIVE
Start: 2020-03-05

## 2022-07-07 ENCOUNTER — OFFICE (OUTPATIENT)
Dept: URBAN - METROPOLITAN AREA CLINIC 11 | Facility: CLINIC | Age: 67
End: 2022-07-07

## 2022-07-07 VITALS
HEIGHT: 65 IN | OXYGEN SATURATION: 100 % | DIASTOLIC BLOOD PRESSURE: 60 MMHG | SYSTOLIC BLOOD PRESSURE: 127 MMHG | WEIGHT: 180 LBS | HEART RATE: 68 BPM

## 2022-07-07 DIAGNOSIS — K59.00 CONSTIPATION, UNSPECIFIED: ICD-10-CM

## 2022-07-07 DIAGNOSIS — K74.3 PRIMARY BILIARY CIRRHOSIS: ICD-10-CM

## 2022-07-07 DIAGNOSIS — K75.81 NONALCOHOLIC STEATOHEPATITIS (NASH): ICD-10-CM

## 2022-07-07 DIAGNOSIS — Z86.010 PERSONAL HISTORY OF COLONIC POLYPS: ICD-10-CM

## 2022-07-07 DIAGNOSIS — K21.9 GASTRO-ESOPHAGEAL REFLUX DISEASE WITHOUT ESOPHAGITIS: ICD-10-CM

## 2022-07-07 LAB
CBC, PLATELET, NO DIFFERENTIAL: HEMATOCRIT: 38 % (ref 34–46.6)
CBC, PLATELET, NO DIFFERENTIAL: HEMOGLOBIN: 12.6 G/DL (ref 11.1–15.9)
CBC, PLATELET, NO DIFFERENTIAL: MCH: 29.2 PG (ref 26.6–33)
CBC, PLATELET, NO DIFFERENTIAL: MCHC: 33.2 G/DL (ref 31.5–35.7)
CBC, PLATELET, NO DIFFERENTIAL: MCV: 88 FL (ref 79–97)
CBC, PLATELET, NO DIFFERENTIAL: PLATELETS: 309 X10E3/UL (ref 150–450)
CBC, PLATELET, NO DIFFERENTIAL: RBC: 4.32 X10E6/UL (ref 3.77–5.28)
CBC, PLATELET, NO DIFFERENTIAL: RDW: 12.7 % (ref 11.7–15.4)
CBC, PLATELET, NO DIFFERENTIAL: WBC: 8.7 X10E3/UL (ref 3.4–10.8)
COMP. METABOLIC PANEL (14): A/G RATIO: 1.5 (ref 1.2–2.2)
COMP. METABOLIC PANEL (14): ALBUMIN: 4.5 G/DL (ref 3.8–4.8)
COMP. METABOLIC PANEL (14): ALKALINE PHOSPHATASE: 343 IU/L — HIGH (ref 44–121)
COMP. METABOLIC PANEL (14): ALT (SGPT): 28 IU/L (ref 0–32)
COMP. METABOLIC PANEL (14): AST (SGOT): 37 IU/L (ref 0–40)
COMP. METABOLIC PANEL (14): BILIRUBIN, TOTAL: 0.4 MG/DL (ref 0–1.2)
COMP. METABOLIC PANEL (14): BUN/CREATININE RATIO: 24 (ref 12–28)
COMP. METABOLIC PANEL (14): BUN: 21 MG/DL (ref 8–27)
COMP. METABOLIC PANEL (14): CALCIUM: 10.2 MG/DL (ref 8.7–10.3)
COMP. METABOLIC PANEL (14): CARBON DIOXIDE, TOTAL: 26 MMOL/L (ref 20–29)
COMP. METABOLIC PANEL (14): CHLORIDE: 96 MMOL/L (ref 96–106)
COMP. METABOLIC PANEL (14): CREATININE: 0.88 MG/DL (ref 0.57–1)
COMP. METABOLIC PANEL (14): EGFR: 72 ML/MIN/1.73 (ref 59–?)
COMP. METABOLIC PANEL (14): GLOBULIN, TOTAL: 3 G/DL (ref 1.5–4.5)
COMP. METABOLIC PANEL (14): GLUCOSE: 121 MG/DL — HIGH (ref 65–99)
COMP. METABOLIC PANEL (14): POTASSIUM: 4.7 MMOL/L (ref 3.5–5.2)
COMP. METABOLIC PANEL (14): PROTEIN, TOTAL: 7.5 G/DL (ref 6–8.5)
COMP. METABOLIC PANEL (14): SODIUM: 133 MMOL/L — LOW (ref 134–144)
PROTHROMBIN TIME (PT): INR: 0.9 (ref 0.9–1.2)
PROTHROMBIN TIME (PT): PROTHROMBIN TIME: 9.7 SEC (ref 9.1–12)

## 2022-07-07 PROCEDURE — 99214 OFFICE O/P EST MOD 30 MIN: CPT | Performed by: INTERNAL MEDICINE

## 2022-07-07 RX ORDER — URSODIOL 300 MG/1
1200 CAPSULE ORAL
Qty: 120 | Refills: 11 | Status: ACTIVE
Start: 2020-03-05

## 2023-01-19 ENCOUNTER — OFFICE (OUTPATIENT)
Dept: URBAN - METROPOLITAN AREA CLINIC 14 | Facility: CLINIC | Age: 68
End: 2023-01-19

## 2023-01-19 VITALS
HEIGHT: 65 IN | SYSTOLIC BLOOD PRESSURE: 156 MMHG | WEIGHT: 187 LBS | DIASTOLIC BLOOD PRESSURE: 70 MMHG | HEART RATE: 55 BPM | OXYGEN SATURATION: 98 %

## 2023-01-19 DIAGNOSIS — K59.00 CONSTIPATION, UNSPECIFIED: ICD-10-CM

## 2023-01-19 DIAGNOSIS — K75.81 NONALCOHOLIC STEATOHEPATITIS (NASH): ICD-10-CM

## 2023-01-19 DIAGNOSIS — K74.3 PRIMARY BILIARY CIRRHOSIS: ICD-10-CM

## 2023-01-19 DIAGNOSIS — K21.9 GASTRO-ESOPHAGEAL REFLUX DISEASE WITHOUT ESOPHAGITIS: ICD-10-CM

## 2023-01-19 DIAGNOSIS — Z86.010 PERSONAL HISTORY OF COLONIC POLYPS: ICD-10-CM

## 2023-01-19 PROCEDURE — 99213 OFFICE O/P EST LOW 20 MIN: CPT | Performed by: INTERNAL MEDICINE

## 2023-01-19 RX ORDER — CHOLESTYRAMINE 4 G/9G
8 POWDER, FOR SUSPENSION ORAL
Qty: 60 | Refills: 11 | Status: ACTIVE
Start: 2023-01-19

## 2023-01-20 LAB
CBC, PLATELET, NO DIFFERENTIAL: HEMATOCRIT: 38.6 % (ref 34–46.6)
CBC, PLATELET, NO DIFFERENTIAL: HEMOGLOBIN: 12.7 G/DL (ref 11.1–15.9)
CBC, PLATELET, NO DIFFERENTIAL: MCH: 28.9 PG (ref 26.6–33)
CBC, PLATELET, NO DIFFERENTIAL: MCHC: 32.9 G/DL (ref 31.5–35.7)
CBC, PLATELET, NO DIFFERENTIAL: MCV: 88 FL (ref 79–97)
CBC, PLATELET, NO DIFFERENTIAL: PLATELETS: 273 X10E3/UL (ref 150–450)
CBC, PLATELET, NO DIFFERENTIAL: RBC: 4.39 X10E6/UL (ref 3.77–5.28)
CBC, PLATELET, NO DIFFERENTIAL: RDW: 13 % (ref 11.7–15.4)
CBC, PLATELET, NO DIFFERENTIAL: WBC: 7.4 X10E3/UL (ref 3.4–10.8)
COMP. METABOLIC PANEL (14): A/G RATIO: 1.6 (ref 1.2–2.2)
COMP. METABOLIC PANEL (14): ALBUMIN: 4.4 G/DL (ref 3.8–4.8)
COMP. METABOLIC PANEL (14): ALKALINE PHOSPHATASE: 184 IU/L — HIGH (ref 44–121)
COMP. METABOLIC PANEL (14): ALT (SGPT): 22 IU/L (ref 0–32)
COMP. METABOLIC PANEL (14): AST (SGOT): 32 IU/L (ref 0–40)
COMP. METABOLIC PANEL (14): BILIRUBIN, TOTAL: 0.3 MG/DL (ref 0–1.2)
COMP. METABOLIC PANEL (14): BUN/CREATININE RATIO: 20 (ref 12–28)
COMP. METABOLIC PANEL (14): BUN: 16 MG/DL (ref 8–27)
COMP. METABOLIC PANEL (14): CALCIUM: 10.1 MG/DL (ref 8.7–10.3)
COMP. METABOLIC PANEL (14): CARBON DIOXIDE, TOTAL: 21 MMOL/L (ref 20–29)
COMP. METABOLIC PANEL (14): CHLORIDE: 98 MMOL/L (ref 96–106)
COMP. METABOLIC PANEL (14): CREATININE: 0.79 MG/DL (ref 0.57–1)
COMP. METABOLIC PANEL (14): EGFR: 82 ML/MIN/1.73 (ref 59–?)
COMP. METABOLIC PANEL (14): GLOBULIN, TOTAL: 2.8 G/DL (ref 1.5–4.5)
COMP. METABOLIC PANEL (14): GLUCOSE: 120 MG/DL — HIGH (ref 70–99)
COMP. METABOLIC PANEL (14): POTASSIUM: 4.7 MMOL/L (ref 3.5–5.2)
COMP. METABOLIC PANEL (14): PROTEIN, TOTAL: 7.2 G/DL (ref 6–8.5)
COMP. METABOLIC PANEL (14): SODIUM: 139 MMOL/L (ref 134–144)
LIPID PANEL: CHOLESTEROL, TOTAL: 217 MG/DL — HIGH (ref 100–199)
LIPID PANEL: COMMENT: (no result)
LIPID PANEL: HDL CHOLESTEROL: 85 MG/DL (ref 39–?)
LIPID PANEL: LDL CHOL CALC (NIH): 114 MG/DL — HIGH (ref 0–99)
LIPID PANEL: TRIGLYCERIDES: 103 MG/DL (ref 0–149)
LIPID PANEL: VLDL CHOLESTEROL CAL: 18 MG/DL (ref 5–40)
PROTHROMBIN TIME (PT): INR: 1 (ref 0.9–1.2)
PROTHROMBIN TIME (PT): PROTHROMBIN TIME: 10.2 SEC (ref 9.1–12)

## 2023-02-06 ENCOUNTER — OFFICE (OUTPATIENT)
Dept: URBAN - METROPOLITAN AREA CLINIC 14 | Facility: CLINIC | Age: 68
End: 2023-02-06

## 2023-02-06 VITALS — HEIGHT: 65 IN

## 2023-02-06 DIAGNOSIS — K75.81 NONALCOHOLIC STEATOHEPATITIS (NASH): ICD-10-CM

## 2023-02-06 DIAGNOSIS — R74.8 ABNORMAL LEVELS OF OTHER SERUM ENZYMES: ICD-10-CM

## 2023-02-06 PROCEDURE — 91200 LIVER ELASTOGRAPHY: CPT | Performed by: INTERNAL MEDICINE

## 2023-10-26 ENCOUNTER — OFFICE (OUTPATIENT)
Dept: URBAN - METROPOLITAN AREA CLINIC 14 | Facility: CLINIC | Age: 68
End: 2023-10-26

## 2023-10-26 VITALS
SYSTOLIC BLOOD PRESSURE: 133 MMHG | DIASTOLIC BLOOD PRESSURE: 70 MMHG | WEIGHT: 189 LBS | HEIGHT: 65 IN | HEART RATE: 83 BPM | OXYGEN SATURATION: 96 % | RESPIRATION RATE: 18 BRPM

## 2023-10-26 DIAGNOSIS — K74.3 PRIMARY BILIARY CIRRHOSIS: ICD-10-CM

## 2023-10-26 DIAGNOSIS — K75.81 NONALCOHOLIC STEATOHEPATITIS (NASH): ICD-10-CM

## 2023-10-26 DIAGNOSIS — K74.00 HEPATIC FIBROSIS, UNSPECIFIED: ICD-10-CM

## 2023-10-26 DIAGNOSIS — Z86.010 PERSONAL HISTORY OF COLONIC POLYPS: ICD-10-CM

## 2023-10-26 PROCEDURE — 99214 OFFICE O/P EST MOD 30 MIN: CPT | Performed by: INTERNAL MEDICINE

## 2023-10-26 RX ORDER — SODIUM PICOSULFATE, MAGNESIUM OXIDE, AND ANHYDROUS CITRIC ACID 10; 3.5; 12 MG/160ML; G/160ML; G/160ML
LIQUID ORAL
Qty: 350 | Refills: 0 | Status: COMPLETED
Start: 2023-10-26 | End: 2024-08-13

## 2023-10-26 NOTE — SERVICENOTES
MD Addendum: The patient was seen along with RENEE Car.  I have evaluated the patient, discussed with her, and agree with the above documented findings, impression, and plan of care.-BRENNON

## 2023-10-26 NOTE — SERVICEHPINOTES
Flori Paul   is a   68   year old  female   here today for follow up. Patient had a Fibroscan done on 2/2023 which revealed score of 9.8. Overall she has been doing well. She continues to take Ocaliva 10mg daily and Ursodiol 300mg 2 capsules twice a day. She reports occasional itching that happens every 2-3 months and usually lasts for 2-3 days. She take Cholestyramine as needed for itching which is helping. She states that she had a fall ~ a month ago and hit her head. She was evaluated at [facility] and was told that she has atherosclerosis. She is scheduled to see a cardiologist next week. She denies any known heart disease or taking blood thinner. She gained 2 lbs since last time she was seen. She denies any fever, chills, nausea, vomiting, abdominal pain, jaundice or bleeding. She is due for colonoscopy in December. Fibroscan due 2/2024. Previous GI History:Leonardo was initially seen by me in May 2015 because of elevated liver enzymes. She had been followed by Dr. Madrid in the past and apparently had a liver biopsy in 2005 which she told was negative. She returned with persistent elevated liver enzymes including significant elevation of alkaline phosphatase with normal bilirubin. Abdominal ultrasound was performed that revealed fatty liver but otherwise did not reveal any biliary obstruction. CT-guided liver biopsy was performed in 2015 which revealed steatohepatitis grade 1 of 4 and primary biliary cholangitis with fibrosis stage 1 of 4. Because of this the patient was started on ursodiol 600 mg twice a day, which is consistent with 13-15 mg/kg per day. Due to no improvement of her alkaline phosphatase on Ursodiol we did switch her to Ocaliva which she started at the end of 2016. Her alk-phos did come down slightly. She does have some occasional reflux for which she takes Prevacid over-the-counter on an as-needed basis. She did undergo colon polyp surveillance in November 2015 with removal of three small adenomas. She did undergo repeat colonoscopy in December 2018 with removal of a couple of adenomatous polyps. Liver ultrasound was unremarkable except for some fatty liver. Her most recent blood work in August did reveal persistent elevation of alk-phos at 387 and GGT at 665. Because of this and her failure of qualification for the research study we decided to put her back Ocaliva for which she started around November 2019 and was titrated up to 10mg/day. She did undergo fibro scan in March 2020 which was consistent with minimal to no fibrosis. Unfortunately, her insurance stopped covering Ocaliva since around February or March 2021 and so since then she has only been on ursodiol monotherapy for her primary biliary cholangitis. span id="{[id number]}" class="narrative freetextSelected" type="freetext" canedit="true" suppressed="false" nid="[id number]" gid="{[id number]}" bound="false" visited="true" /spanspan id="{[id number]}" class="narrative placeholderNormal" type="placeholder" canedit="true" suppressed="true" text="[ROS Wording]" nid="[id number]" gid="{[id number]}" propertyname="rosWording" displayname="ROS Wording" tooltip="" handler="" handlerdata="" datatype="text" mandatory="false" requires="" allowmultipleentries="" describes="" tagname="" prototype="" dontshowempty="false" empty="true" onmouseover="__NarrativeOnMouseOver('{[id number]}')" onmouseout="__NarrativeOnMouseOut('{[id number]}')" onmousedown="__NarrativePlaceholderClicked('{[id number]}')"/spanspan id="{[id number]}" class="narrative freetextNormal" type="freetext" canedit="true" suppressed="false" nid="[id number]" gid="{[id number]}" bound="false" /span

## 2023-10-27 LAB
CBC, PLATELET, NO DIFFERENTIAL: HEMATOCRIT: 42.1 % (ref 34–46.6)
CBC, PLATELET, NO DIFFERENTIAL: HEMOGLOBIN: 13.9 G/DL (ref 11.1–15.9)
CBC, PLATELET, NO DIFFERENTIAL: MCH: 29.4 PG (ref 26.6–33)
CBC, PLATELET, NO DIFFERENTIAL: MCHC: 33 G/DL (ref 31.5–35.7)
CBC, PLATELET, NO DIFFERENTIAL: MCV: 89 FL (ref 79–97)
CBC, PLATELET, NO DIFFERENTIAL: PLATELETS: 267 X10E3/UL (ref 150–450)
CBC, PLATELET, NO DIFFERENTIAL: RBC: 4.73 X10E6/UL (ref 3.77–5.28)
CBC, PLATELET, NO DIFFERENTIAL: RDW: 12.8 % (ref 11.7–15.4)
CBC, PLATELET, NO DIFFERENTIAL: WBC: 7.3 X10E3/UL (ref 3.4–10.8)
COMP. METABOLIC PANEL (14): A/G RATIO: 2.1 (ref 1.2–2.2)
COMP. METABOLIC PANEL (14): ALBUMIN: 4.6 G/DL (ref 3.9–4.9)
COMP. METABOLIC PANEL (14): ALKALINE PHOSPHATASE: 181 IU/L — HIGH (ref 44–121)
COMP. METABOLIC PANEL (14): ALT (SGPT): 28 IU/L (ref 0–32)
COMP. METABOLIC PANEL (14): AST (SGOT): 38 IU/L (ref 0–40)
COMP. METABOLIC PANEL (14): BILIRUBIN, TOTAL: 0.3 MG/DL (ref 0–1.2)
COMP. METABOLIC PANEL (14): BUN/CREATININE RATIO: 23 (ref 12–28)
COMP. METABOLIC PANEL (14): BUN: 23 MG/DL (ref 8–27)
COMP. METABOLIC PANEL (14): CALCIUM: 10 MG/DL (ref 8.7–10.3)
COMP. METABOLIC PANEL (14): CARBON DIOXIDE, TOTAL: 24 MMOL/L (ref 20–29)
COMP. METABOLIC PANEL (14): CHLORIDE: 104 MMOL/L (ref 96–106)
COMP. METABOLIC PANEL (14): CREATININE: 0.99 MG/DL (ref 0.57–1)
COMP. METABOLIC PANEL (14): EGFR: 62 ML/MIN/1.73 (ref 59–?)
COMP. METABOLIC PANEL (14): GLOBULIN, TOTAL: 2.2 G/DL (ref 1.5–4.5)
COMP. METABOLIC PANEL (14): GLUCOSE: 119 MG/DL — HIGH (ref 70–99)
COMP. METABOLIC PANEL (14): POTASSIUM: 4.4 MMOL/L (ref 3.5–5.2)
COMP. METABOLIC PANEL (14): PROTEIN, TOTAL: 6.8 G/DL (ref 6–8.5)
COMP. METABOLIC PANEL (14): SODIUM: 141 MMOL/L (ref 134–144)
PROTHROMBIN TIME (PT): INR: 1 (ref 0.9–1.2)
PROTHROMBIN TIME (PT): PROTHROMBIN TIME: 10.5 SEC (ref 9.1–12)

## 2024-08-13 ENCOUNTER — OFFICE (OUTPATIENT)
Dept: URBAN - METROPOLITAN AREA PATHOLOGY 12 | Facility: PATHOLOGY | Age: 69
End: 2024-08-13
Payer: MEDICARE

## 2024-08-13 DIAGNOSIS — D12.5 BENIGN NEOPLASM OF SIGMOID COLON: ICD-10-CM

## 2024-08-13 DIAGNOSIS — Z09 ENCOUNTER FOR FOLLOW-UP EXAMINATION AFTER COMPLETED TREATMEN: ICD-10-CM

## 2024-08-13 DIAGNOSIS — D12.2 BENIGN NEOPLASM OF ASCENDING COLON: ICD-10-CM

## 2024-08-13 DIAGNOSIS — D12.3 BENIGN NEOPLASM OF TRANSVERSE COLON: ICD-10-CM

## 2024-08-13 DIAGNOSIS — Z86.010 PERSONAL HISTORY OF COLONIC POLYPS: ICD-10-CM

## 2024-08-13 DIAGNOSIS — D12.0 BENIGN NEOPLASM OF CECUM: ICD-10-CM

## 2024-08-13 PROBLEM — K64.1 SECOND DEGREE HEMORRHOIDS: Status: ACTIVE | Noted: 2024-08-13

## 2024-08-13 PROBLEM — K63.5 POLYP OF COLON: Status: ACTIVE | Noted: 2024-08-13

## 2024-08-13 PROCEDURE — 88305 TISSUE EXAM BY PATHOLOGIST: CPT | Performed by: STUDENT IN AN ORGANIZED HEALTH CARE EDUCATION/TRAINING PROGRAM
